# Patient Record
Sex: FEMALE | Race: WHITE | URBAN - METROPOLITAN AREA
[De-identification: names, ages, dates, MRNs, and addresses within clinical notes are randomized per-mention and may not be internally consistent; named-entity substitution may affect disease eponyms.]

---

## 2022-07-09 ENCOUNTER — TELEPHONE ENCOUNTER (OUTPATIENT)
Dept: URBAN - METROPOLITAN AREA CLINIC 121 | Facility: CLINIC | Age: 67
End: 2022-07-09

## 2022-07-10 ENCOUNTER — TELEPHONE ENCOUNTER (OUTPATIENT)
Dept: URBAN - METROPOLITAN AREA CLINIC 121 | Facility: CLINIC | Age: 67
End: 2022-07-10

## 2022-07-30 ENCOUNTER — TELEPHONE ENCOUNTER (OUTPATIENT)
Age: 67
End: 2022-07-30

## 2022-07-31 ENCOUNTER — TELEPHONE ENCOUNTER (OUTPATIENT)
Age: 67
End: 2022-07-31

## 2024-12-11 ENCOUNTER — P2P PATIENT RECORD (OUTPATIENT)
Age: 69
End: 2024-12-11

## 2024-12-12 ENCOUNTER — TELEPHONE ENCOUNTER (OUTPATIENT)
Dept: URBAN - METROPOLITAN AREA CLINIC 64 | Facility: CLINIC | Age: 69
End: 2024-12-12

## 2025-03-20 ENCOUNTER — LAB OUTSIDE AN ENCOUNTER (OUTPATIENT)
Dept: URBAN - METROPOLITAN AREA CLINIC 63 | Facility: CLINIC | Age: 70
End: 2025-03-20

## 2025-03-20 ENCOUNTER — OFFICE VISIT (OUTPATIENT)
Dept: URBAN - METROPOLITAN AREA CLINIC 63 | Facility: CLINIC | Age: 70
End: 2025-03-20
Payer: COMMERCIAL

## 2025-03-20 ENCOUNTER — DASHBOARD ENCOUNTERS (OUTPATIENT)
Age: 70
End: 2025-03-20

## 2025-03-20 VITALS
TEMPERATURE: 98.3 F | OXYGEN SATURATION: 97 % | HEIGHT: 64 IN | BODY MASS INDEX: 22.26 KG/M2 | DIASTOLIC BLOOD PRESSURE: 70 MMHG | WEIGHT: 130.4 LBS | HEART RATE: 66 BPM | SYSTOLIC BLOOD PRESSURE: 110 MMHG

## 2025-03-20 DIAGNOSIS — Z12.11 SCREEN FOR COLON CANCER: ICD-10-CM

## 2025-03-20 PROCEDURE — 99204 OFFICE O/P NEW MOD 45 MIN: CPT

## 2025-03-20 RX ORDER — DENOSUMAB 60 MG/ML
INJECTION SUBCUTANEOUS
Qty: 1 MILLILITER | Status: ACTIVE | COMMUNITY

## 2025-03-20 RX ORDER — ERGOCALCIFEROL CAPSULES, 1.25 MG/1
TAKE 1 CAPSULE EVERY WEEK BY ORAL ROUTE CAPSULE ORAL
Qty: 12 EACH | Refills: 2 | Status: ACTIVE | COMMUNITY

## 2025-03-20 RX ORDER — HYDROCHLOROTHIAZIDE 12.5 MG/1
1 CAPSULE IN THE MORNING CAPSULE, GELATIN COATED ORAL ONCE A DAY
Qty: 30 | Status: ACTIVE | COMMUNITY
Start: 2025-03-20 | End: 2025-04-19

## 2025-03-20 RX ORDER — LEVOTHYROXINE SODIUM 0.07 MG/1
TAKE 1 TABLET BY MOUTH EVERY DAY TABLET ORAL
Qty: 90 EACH | Refills: 0 | Status: ACTIVE | COMMUNITY

## 2025-03-20 NOTE — HPI-ZZZTODAY'S VISIT
Patient is a very pleasant 69-year-old female who presents for colon screening.  She is a new patient to our practice and be establishing with Dr. Graham today.  Past medical history significant for cervical cancer, hypothyroidism, lymphedema, osteopenia.  Past surgical history reviewed.  Last colonoscopy about 2008. Recall about 10 years. No signifcant findings Family history noncontributory.  Patient presents for colonoscopy screening. She is asympatomatic from a GI persepective. Patient denies overuse of NSAIDs, pyrosis, dysphagia, dyspepsia, abdominal pain, change in bowel habits, constipation, diarrhea, hematochezia, melena, N/V, kidney problems, anorexia or weight loss.  Denies history of stroke, heart attack, pacemaker, defibrillator or stents, blood thinner use. Denies COPD, asthma or sleep apnea. No family history of colon cancer or polyps.

## 2025-05-07 ENCOUNTER — OFFICE VISIT (OUTPATIENT)
Dept: URBAN - METROPOLITAN AREA CLINIC 57 | Facility: CLINIC | Age: 70
End: 2025-05-07

## 2025-06-13 ENCOUNTER — OFFICE VISIT (OUTPATIENT)
Dept: URBAN - METROPOLITAN AREA SURGERY CENTER 4 | Facility: SURGERY CENTER | Age: 70
End: 2025-06-13
Payer: COMMERCIAL

## 2025-06-13 ENCOUNTER — CLAIMS CREATED FROM THE CLAIM WINDOW (OUTPATIENT)
Dept: URBAN - METROPOLITAN AREA CLINIC 4 | Facility: CLINIC | Age: 70
End: 2025-06-13
Payer: COMMERCIAL

## 2025-06-13 DIAGNOSIS — K63.5 COLON POLYP: ICD-10-CM

## 2025-06-13 DIAGNOSIS — E03.9 HYPOTHYROIDISM, UNSPECIFIED TYPE: ICD-10-CM

## 2025-06-13 DIAGNOSIS — Z12.11 ENCOUNTER FOR SCREENING FOR MALIGNANT NEOPLASM OF COLON: ICD-10-CM

## 2025-06-13 DIAGNOSIS — D12.0 ADENOMA OF CECUM: ICD-10-CM

## 2025-06-13 DIAGNOSIS — K64.0 FIRST DEGREE HEMORRHOIDS: ICD-10-CM

## 2025-06-13 DIAGNOSIS — D12.0 BENIGN NEOPLASM OF CECUM: ICD-10-CM

## 2025-06-13 DIAGNOSIS — Z12.11 ENCOUNTER SCREENING FOR MALIGNANT NEOPLASM OF COLON: ICD-10-CM

## 2025-06-13 PROCEDURE — 88305 TISSUE EXAM BY PATHOLOGIST: CPT | Performed by: PATHOLOGY

## 2025-06-13 PROCEDURE — 00812 ANES LWR INTST SCR COLSC: CPT | Performed by: NURSE ANESTHETIST, CERTIFIED REGISTERED

## 2025-06-13 PROCEDURE — 45385 COLONOSCOPY W/LESION REMOVAL: CPT | Performed by: INTERNAL MEDICINE

## 2025-06-13 RX ORDER — DENOSUMAB 60 MG/ML
INJECTION SUBCUTANEOUS
Qty: 1 MILLILITER | Status: ACTIVE | COMMUNITY

## 2025-06-13 RX ORDER — LEVOTHYROXINE SODIUM 0.07 MG/1
TAKE 1 TABLET BY MOUTH EVERY DAY TABLET ORAL
Qty: 90 EACH | Refills: 0 | Status: ACTIVE | COMMUNITY

## 2025-06-13 RX ORDER — ERGOCALCIFEROL CAPSULES, 1.25 MG/1
TAKE 1 CAPSULE EVERY WEEK BY ORAL ROUTE CAPSULE ORAL
Qty: 12 EACH | Refills: 2 | Status: ACTIVE | COMMUNITY

## 2025-06-30 ENCOUNTER — OFFICE VISIT (OUTPATIENT)
Dept: URBAN - METROPOLITAN AREA CLINIC 63 | Facility: CLINIC | Age: 70
End: 2025-06-30
Payer: COMMERCIAL

## 2025-06-30 DIAGNOSIS — D36.9 ADENOMATOUS POLYP: ICD-10-CM

## 2025-06-30 PROCEDURE — 99213 OFFICE O/P EST LOW 20 MIN: CPT

## 2025-06-30 RX ORDER — DENOSUMAB 60 MG/ML
INJECTION SUBCUTANEOUS
Qty: 1 MILLILITER | Status: ACTIVE | COMMUNITY

## 2025-06-30 RX ORDER — ERGOCALCIFEROL CAPSULES, 1.25 MG/1
TAKE 1 CAPSULE EVERY WEEK BY ORAL ROUTE CAPSULE ORAL
Qty: 12 EACH | Refills: 2 | Status: ACTIVE | COMMUNITY

## 2025-06-30 RX ORDER — LEVOTHYROXINE SODIUM 0.07 MG/1
TAKE 1 TABLET BY MOUTH EVERY DAY TABLET ORAL
Qty: 90 EACH | Refills: 0 | Status: ACTIVE | COMMUNITY

## 2025-06-30 NOTE — HPI-ZZZTODAY'S VISIT
Patient is a very pleasant 69-year-old female presents for follow-up of colonoscopy.  She is a patient of Dr. Graham.  Past medical history significant for cervical cancer, hypothyroidism, lymphedema, osteopenia.  Past surgical history reviewed.  Last colonoscopy 6/13/2025.  Family history noncontributory. She was last seen 3/20/2025 for consult of colon screening.  She presents today in follow-up of procedure.  She tolerated procedure with no complaints.  Her bowels returned to normal after several days.  She denies dysphagia, dyspepsia, pyrosis, abdominal pain, change in bowel habits, unintentional weight loss, melena, hematochezia.

## 2025-06-30 NOTE — HPI-PREVIOUS PROCEDURES
Colonoscopy 6/13/2025 with Dr. Graham at [facility] consistent with internal hemorrhoids 10 mm polyp in cecum pathology consistent with tubular adenoma Recall for 3 years